# Patient Record
Sex: FEMALE | Race: WHITE | NOT HISPANIC OR LATINO | Employment: STUDENT | URBAN - METROPOLITAN AREA
[De-identification: names, ages, dates, MRNs, and addresses within clinical notes are randomized per-mention and may not be internally consistent; named-entity substitution may affect disease eponyms.]

---

## 2017-08-28 PROBLEM — R51.9 HEADACHE: Status: ACTIVE | Noted: 2017-08-28

## 2017-08-28 PROBLEM — E22.1 HYPERPROLACTINEMIA (HCC): Status: ACTIVE | Noted: 2017-08-28

## 2017-08-28 PROBLEM — D35.2 BENIGN NEOPLASM OF PITUITARY GLAND (HCC): Status: ACTIVE | Noted: 2017-08-28

## 2017-08-28 PROBLEM — R93.89 ABNORMAL MAGNETIC RESONANCE IMAGING STUDY: Status: ACTIVE | Noted: 2017-08-28

## 2017-08-28 PROBLEM — G43.909 MIGRAINE: Status: ACTIVE | Noted: 2017-08-28

## 2017-08-28 PROBLEM — I47.20 VENTRICULAR TACHYCARDIA (HCC): Status: ACTIVE | Noted: 2017-08-28

## 2017-08-28 PROBLEM — R35.1 NOCTURIA: Status: ACTIVE | Noted: 2017-08-28

## 2017-08-28 RX ORDER — CABERGOLINE 0.5 MG/1
TABLET ORAL
COMMUNITY
Start: 2014-11-29 | End: 2017-10-20

## 2017-08-28 RX ORDER — IBUPROFEN 200 MG
TABLET ORAL
COMMUNITY
Start: 2014-06-10

## 2017-08-28 RX ORDER — SUMATRIPTAN 100 MG/1
TABLET, FILM COATED ORAL
COMMUNITY
Start: 2012-05-22

## 2017-08-28 RX ORDER — NORETHINDRONE ACETATE AND ETHINYL ESTRADIOL 1MG-20(21)
KIT ORAL
COMMUNITY
Start: 2014-11-19

## 2017-10-20 ENCOUNTER — OFFICE VISIT (OUTPATIENT)
Dept: SURGERY | Facility: CLINIC | Age: 22
End: 2017-10-20

## 2017-10-20 VITALS
TEMPERATURE: 97.8 F | OXYGEN SATURATION: 98 % | SYSTOLIC BLOOD PRESSURE: 132 MMHG | HEIGHT: 66 IN | DIASTOLIC BLOOD PRESSURE: 88 MMHG | WEIGHT: 137.7 LBS | BODY MASS INDEX: 22.13 KG/M2 | HEART RATE: 84 BPM

## 2017-10-20 DIAGNOSIS — K62.0 ANAL POLYP: Primary | ICD-10-CM

## 2017-10-20 PROCEDURE — 99244 OFF/OP CNSLTJ NEW/EST MOD 40: CPT | Performed by: COLON & RECTAL SURGERY

## 2017-10-20 RX ORDER — FLUOXETINE 10 MG/1
20 CAPSULE ORAL DAILY
COMMUNITY

## 2017-10-20 NOTE — PROGRESS NOTES
Lola Head is a 22 y.o. female who is seen as a consult at the request of Kayy Yap MD for abnormal anal tissue    HPI:    Pt noted extra anal tissue 1year+    Pt thought it was a hemorrhoid  Pt states Dr. Yap told her she has anal warts    Occasionally hurts and small spot of blood rarely: when she wipes vigorously    Occasionally itches    Usually has 1 BM per day    Stools formed    Occasional straining    No fiber supplements    No stool softeners    Might have used PrepH long time ago; she cannot remember    Denies hx abnl Pap    Had gardisil shots as teenager  Pituitary mass: follows with Ankur Winter MD neurosurg @ [facility] cardiac ablation for SVT 2012    Past Medical History:   Diagnosis Date   • Hx of migraines    • Hyperprolactinemia    • Hypertension    • Hypokalemia    • Nocturnal polyuria    • Persistent headaches 06/29/2014    MRI OF BRAIN AT [facility] SHOWED MILD PARANASAL SINUS DISEASE, AND A MILD HETEROGENEOUS CHARACTER TO THE ANTERIOR LOBE OF THE PITUITARY GLAND   • Pituitary benign neoplasm 11/21/2014    BENIGN PITUITARY TUMOR   • SVT (supraventricular tachycardia)        Past Surgical History:   Procedure Laterality Date   • CARDIAC ELECTROPHYSIOLOGY STUDY AND ABLATION Left 12/12/2012    DR. EDWARDS AT [facility]   • SINUS SURGERY N/A 2005    HAD 2 SURGERIES IN 2005(DR. NOAH LUEVANO), ALSO IN 2007 AND 2011(DR. RADHA LONGORIA)       Social History:   reports that she has never smoked. She has never used smokeless tobacco. She reports that she drinks alcohol.      Marriage status: Unknown    Family History   Problem Relation Age of Onset   • Hypertension Father    • Stroke Father    • Migraines Father    • Heart disease Other          Current Outpatient Prescriptions:   •  FLUoxetine (PROzac) 10 MG capsule, Take 20 mg by mouth Daily., Disp: , Rfl:   •  ibuprofen (ADVIL,MOTRIN) 200 MG tablet, Take  by mouth., Disp: , Rfl:   •  norethindrone-ethinyl estradiol FE (JUNEL FE 1/20) 1-20  MG-MCG per tablet, Take  by mouth., Disp: , Rfl:   •  SUMAtriptan (IMITREX) 100 MG tablet, Take  by mouth., Disp: , Rfl:     Allergy  Review of patient's allergies indicates no known allergies.    Review of Systems   Constitution: Negative. Negative for chills and decreased appetite.   Eyes: Negative.    Cardiovascular: Negative.    Respiratory: Negative.    Endocrine: Negative.    Hematologic/Lymphatic: Negative.    Skin: Negative.    Musculoskeletal: Negative.    Gastrointestinal: Negative.    Genitourinary: Negative.    Neurological: Positive for headaches. Negative for brief paralysis, excessive daytime sleepiness, dizziness, focal weakness and light-headedness.   Psychiatric/Behavioral: Negative.    Allergic/Immunologic: Negative.        Vitals:    10/20/17 1006   BP: 132/88   Pulse: 84   Temp: 97.8 °F (36.6 °C)   SpO2: 98%     Body mass index is 22.23 kg/(m^2).    Physical Exam   Constitutional: She is oriented to person, place, and time. She appears well-developed and well-nourished. No distress.   HENT:   Head: Normocephalic and atraumatic.   Nose: Nose normal.   Mouth/Throat: Oropharynx is clear and moist.   Eyes: Conjunctivae and EOM are normal. Pupils are equal, round, and reactive to light.   Neck: Normal range of motion. No tracheal deviation present.   Pulmonary/Chest: Effort normal and breath sounds normal. No respiratory distress.   Abdominal: Soft. Bowel sounds are normal. She exhibits no distension.   Genitourinary:   Genitourinary Comments: Perianal exam: external hem - wnl.  1.5cm fibroepithelial anal polyp.  No condyloma.  No blood or drainage.  No edema or erythema  ROSALIND- good tone, no masses  Anoscopy performed:  wnl internal hem, RA anal polyp   Musculoskeletal: Normal range of motion. She exhibits no edema or deformity.   Neurological: She is alert and oriented to person, place, and time. No cranial nerve deficit. Coordination and gait normal.   Skin: Skin is warm and dry.   Psychiatric: She  has a normal mood and affect. Her behavior is normal. Judgment normal.       Review of Medical Record:  I reviewed Dr. Yap note    Assessment:  1. Anal polyp        Plan:    I recommend in-office excision anal polyp.   I described with patient typical surgical time, postop recovery including pain management, and restrictions. I discussed with patient risks, benefits, and alternatives.  The patient had opportunity to ask questions.  I answered all questions.  Patient understands and wishes to proceed with procedure.      Scribed for Akin Lal MD by Vianney Alberts PA-C 10/20/2017  This patient was evaluated by me, recommendations made, documentation reviewed, edited, and revised by me, Akin Lal MD

## 2017-12-01 ENCOUNTER — PROCEDURE VISIT (OUTPATIENT)
Dept: SURGERY | Facility: CLINIC | Age: 22
End: 2017-12-01

## 2017-12-01 VITALS
OXYGEN SATURATION: 98 % | HEART RATE: 76 BPM | DIASTOLIC BLOOD PRESSURE: 82 MMHG | SYSTOLIC BLOOD PRESSURE: 112 MMHG | TEMPERATURE: 98.6 F | HEIGHT: 66 IN | BODY MASS INDEX: 23.13 KG/M2 | WEIGHT: 143.9 LBS

## 2017-12-01 DIAGNOSIS — K62.0 ANAL POLYP: Primary | ICD-10-CM

## 2017-12-01 PROCEDURE — 46220 EXCISE ANAL EXT TAG/PAPILLA: CPT | Performed by: COLON & RECTAL SURGERY

## 2017-12-01 PROCEDURE — 88304 TISSUE EXAM BY PATHOLOGIST: CPT | Performed by: PHYSICIAN ASSISTANT

## 2017-12-01 RX ORDER — HYDROCODONE BITARTRATE AND ACETAMINOPHEN 5; 325 MG/1; MG/1
TABLET ORAL
Qty: 12 TABLET | Refills: 0 | Status: SHIPPED | OUTPATIENT
Start: 2017-12-01 | End: 2017-12-02

## 2017-12-01 RX ORDER — LIDOCAINE 50 MG/G
OINTMENT TOPICAL EVERY 4 HOURS PRN
Qty: 1 TUBE | Refills: 5 | Status: SHIPPED | OUTPATIENT
Start: 2017-12-01 | End: 2017-12-03

## 2017-12-01 NOTE — PROGRESS NOTES
"Lola Head is a 22 y.o. female in for follow up of Anal polyp    Patient wishes to discuss anal polyp excision    Her symptoms are unchanged    The polyp bothers her on a daily basis, and she wishes to have it removed    /82 (BP Location: Left arm, Patient Position: Sitting)  Pulse 76  Temp 98.6 °F (37 °C)  Ht 66\" (167.6 cm)  Wt 143 lb 14.4 oz (65.3 kg)  LMP 11/24/2017  SpO2 98%  Breastfeeding? No  BMI 23.23 kg/m2  Body mass index is 23.23 kg/(m^2).      PE:  Physical Exam   Constitutional: She appears well-developed. No distress.   HENT:   Head: Normocephalic and atraumatic.   Abdominal: Soft. She exhibits no distension.   Genitourinary:   Genitourinary Comments: Perianal exam: external: anal polyp unchanged   Musculoskeletal: Normal range of motion.   Neurological: She is alert.   Psychiatric: Thought content normal.         Assessment:   1. Anal polyp         Plan:       Discussed anal polyp excision with patient.  I described with patient typical surgical time, postop recovery including pain management, and restrictions. I discussed with patient risks, benefits, and alternatives.  The patient had opportunity to ask questions.  I answered all questions.  Patient understands and wishes to proceed with procedure.    RTC 10 days          Procedure: anal polyp excision    Patient gave informed consent verbally.  Patient was prepped with Betadine.  1% lidocaine with epinephrine and 0.25 percent Marcaine plain was used as a local infiltration.  Anal polyp excised at its base with elliptical incision using a 15-blade.  AgNO3 applied.  Site closed with 4-0 vicryl in an interrupted fashion. Hemostasis achieved.    Patient tolerated well.    Patient was given Rx for pain medication, after care information sheet with warning signs, and follow up appt in 7-10 days.      Scribed for Akin Lal MD by Vianney Alberts PA-C 12/1/2017  This patient was evaluated by me, recommendations made, documentation " reviewed, edited, and revised by me, Akin Lal MD

## 2017-12-02 ENCOUNTER — LAB REQUISITION (OUTPATIENT)
Dept: LAB | Facility: HOSPITAL | Age: 22
End: 2017-12-02

## 2017-12-02 DIAGNOSIS — K62.0 ANAL POLYP: ICD-10-CM

## 2017-12-04 ENCOUNTER — TELEPHONE (OUTPATIENT)
Dept: SURGERY | Facility: CLINIC | Age: 22
End: 2017-12-04

## 2017-12-04 LAB
CYTO UR: NORMAL
LAB AP CASE REPORT: NORMAL
Lab: NORMAL
PATH REPORT.FINAL DX SPEC: NORMAL
PATH REPORT.GROSS SPEC: NORMAL

## 2017-12-04 NOTE — TELEPHONE ENCOUNTER
Patient s/p polyp excision on Friday, 12/01, and would like to resume using the elliptical machine today. Denies bleeding, reports some discomfort in this area, describes as manageable. Please advise.

## 2017-12-13 ENCOUNTER — OFFICE VISIT (OUTPATIENT)
Dept: SURGERY | Facility: CLINIC | Age: 22
End: 2017-12-13

## 2017-12-13 VITALS
SYSTOLIC BLOOD PRESSURE: 132 MMHG | HEART RATE: 71 BPM | HEIGHT: 67 IN | DIASTOLIC BLOOD PRESSURE: 90 MMHG | TEMPERATURE: 98.3 F | OXYGEN SATURATION: 98 % | BODY MASS INDEX: 22.73 KG/M2 | WEIGHT: 144.8 LBS

## 2017-12-13 DIAGNOSIS — K62.0 ANAL POLYP: Primary | ICD-10-CM

## 2017-12-13 PROCEDURE — 99024 POSTOP FOLLOW-UP VISIT: CPT | Performed by: PHYSICIAN ASSISTANT

## 2017-12-13 NOTE — PROGRESS NOTES
"Lola Head is a 22 y.o. female in for follow up of Anal polyp  s/p excision anal polyp 12/1/2017    Patient states that she is doing well    She is not currently having any anorectal pain    Had minor pain for first 2 days postop  Took norco for 1 day, then Tylenol extra strength for 1 day  Has not needed any medication since    No blood per rectum currently  Most recently noted spotty bleeding 2 days ago    Having 1-2 bowel movements per day    Stools are formed    Use MiraLAX for 3-4 days postop, then discontinued, as she felt she no longer needed    No nausea or vomiting    She did feel she had some chills Saturday, which she attributed to a sinus infection  No fever    Finished finals for semester (law school)    /90 (BP Location: Right arm, Patient Position: Sitting)  Pulse 71  Temp 98.3 °F (36.8 °C)  Ht 168.9 cm (66.5\")  Wt 65.7 kg (144 lb 12.8 oz)  LMP 11/24/2017  SpO2 98%  Breastfeeding? No  BMI 23.02 kg/m2  Body mass index is 23.02 kg/(m^2).      PE:  Physical Exam   Constitutional: She is oriented to person, place, and time. She appears well-developed and well-nourished. No distress.   HENT:   Head: Normocephalic and atraumatic.   Eyes: Pupils are equal, round, and reactive to light.   Neck: No tracheal deviation present.   Pulmonary/Chest: Effort normal. No respiratory distress.   Abdominal: She exhibits no distension.   Genitourinary:   Genitourinary Comments: Perianal exam: external: healing well, no blood or drainage, no edema or erythema   Neurological: She is alert and oriented to person, place, and time.   Skin: Skin is warm and dry. She is not diaphoretic.   Psychiatric: She has a normal mood and affect. Her behavior is normal.   Vitals reviewed.        Assessment:   1. Anal polyp     s/p excision anal polyp 12/1/2017    Plan:      -healing well postop  -pathology fibroepithelial polyp with no atypia  -discussed return precautions, including but not limited to: call or come in " if any fever/chills, nausea/vomiting, rectal pain or pressure, new/worsening anorectal bleeding or drainage (especially if malodorous), issues with bowel/bladder function, or any other concerning symptoms      RTC 4-6 weeks      Vianney Alberts PA-C  12/13/2017  10:36 AM

## 2018-01-10 ENCOUNTER — OFFICE VISIT (OUTPATIENT)
Dept: SURGERY | Facility: CLINIC | Age: 23
End: 2018-01-10

## 2018-01-10 VITALS
WEIGHT: 149 LBS | HEIGHT: 66 IN | TEMPERATURE: 98.4 F | HEART RATE: 80 BPM | DIASTOLIC BLOOD PRESSURE: 70 MMHG | SYSTOLIC BLOOD PRESSURE: 116 MMHG | BODY MASS INDEX: 23.95 KG/M2 | OXYGEN SATURATION: 98 %

## 2018-01-10 DIAGNOSIS — K62.0 ANAL POLYP: Primary | ICD-10-CM

## 2018-01-10 PROCEDURE — 99024 POSTOP FOLLOW-UP VISIT: CPT | Performed by: COLON & RECTAL SURGERY

## 2018-01-10 NOTE — PROGRESS NOTES
"Lola Head is a 22 y.o. 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 No  BMI 24.05 kg/m2  Body mass index is 24.05 kg/(m^2).      PE:  Physical Exam   Constitutional: She appears well-developed. No distress.   HENT:   Head: Normocephalic and atraumatic.   Abdominal: Soft. She exhibits no distension.   Genitourinary:   Genitourinary Comments: Perianal exam: external: well-healed.  No blood or drainage; no edema or erythema   Musculoskeletal: Normal range of motion.   Neurological: She is alert.   Psychiatric: Thought content normal.         Assessment:   1. Anal polyp     s/p excision anal polyp 12/1/2017    Plan:    Doing well post-op.  Recommended daily fiber supplement and prn stool softeners.    Call or come in if any pain, bleeding, issues with BMs, questions, or concerning symptoms      RTC prn      Scribed for Akin Lal MD by Vianney Alberts PA-C 1/10/2018  This patient was evaluated by me, recommendations made, documentation reviewed, edited, and revised by me, Akin Lal MD        "

## 2021-04-16 ENCOUNTER — BULK ORDERING (OUTPATIENT)
Dept: CASE MANAGEMENT | Facility: OTHER | Age: 26
End: 2021-04-16

## 2021-04-16 DIAGNOSIS — Z23 IMMUNIZATION DUE: ICD-10-CM
